# Patient Record
Sex: MALE | Race: WHITE | NOT HISPANIC OR LATINO | Employment: FULL TIME | URBAN - METROPOLITAN AREA
[De-identification: names, ages, dates, MRNs, and addresses within clinical notes are randomized per-mention and may not be internally consistent; named-entity substitution may affect disease eponyms.]

---

## 2017-05-19 ENCOUNTER — HOSPITAL ENCOUNTER (OUTPATIENT)
Dept: RADIOLOGY | Facility: HOSPITAL | Age: 57
Discharge: HOME | End: 2017-05-19
Attending: ORTHOPAEDIC SURGERY
Payer: COMMERCIAL

## 2017-05-19 ENCOUNTER — OFFICE VISIT (OUTPATIENT)
Dept: ORTHOPEDICS | Facility: CLINIC | Age: 57
End: 2017-05-19
Payer: COMMERCIAL

## 2017-05-19 VITALS — HEIGHT: 70 IN | BODY MASS INDEX: 27.35 KG/M2 | WEIGHT: 191 LBS

## 2017-05-19 DIAGNOSIS — M25.511 RIGHT SHOULDER PAIN, UNSPECIFIED CHRONICITY: Primary | ICD-10-CM

## 2017-05-19 DIAGNOSIS — M25.511 ACUTE PAIN OF RIGHT SHOULDER: Primary | ICD-10-CM

## 2017-05-19 DIAGNOSIS — M25.511 RIGHT SHOULDER PAIN, UNSPECIFIED CHRONICITY: ICD-10-CM

## 2017-05-19 PROCEDURE — 96372 THER/PROPH/DIAG INJ SC/IM: CPT | Mod: S$GLB,,, | Performed by: ORTHOPAEDIC SURGERY

## 2017-05-19 PROCEDURE — 99999 PR PBB SHADOW E&M-NEW PATIENT-LVL II: CPT | Mod: PBBFAC,,, | Performed by: ORTHOPAEDIC SURGERY

## 2017-05-19 PROCEDURE — 99203 OFFICE O/P NEW LOW 30 MIN: CPT | Mod: 25,S$GLB,, | Performed by: ORTHOPAEDIC SURGERY

## 2017-05-19 PROCEDURE — 1160F RVW MEDS BY RX/DR IN RCRD: CPT | Mod: S$GLB,,, | Performed by: ORTHOPAEDIC SURGERY

## 2017-05-19 PROCEDURE — 73030 X-RAY EXAM OF SHOULDER: CPT | Mod: TC,PO,RT

## 2017-05-19 PROCEDURE — 73030 X-RAY EXAM OF SHOULDER: CPT | Mod: 26,RT,, | Performed by: RADIOLOGY

## 2017-05-19 RX ORDER — MELOXICAM 15 MG/1
15 TABLET ORAL DAILY
Qty: 30 TABLET | Refills: 1 | Status: SHIPPED | OUTPATIENT
Start: 2017-05-19

## 2017-05-19 RX ORDER — KETOROLAC TROMETHAMINE 30 MG/ML
60 INJECTION, SOLUTION INTRAMUSCULAR; INTRAVENOUS
Status: COMPLETED | OUTPATIENT
Start: 2017-05-19 | End: 2017-05-19

## 2017-05-19 RX ADMIN — KETOROLAC TROMETHAMINE 60 MG: 30 INJECTION, SOLUTION INTRAMUSCULAR; INTRAVENOUS at 10:05

## 2017-05-19 NOTE — PROGRESS NOTES
Barry Stephens, 57 years old, right shoulder pain for two days' time.  No trauma.    Never had a problem in the past.  Pain is up to 10/10 on the pain scale.    Exam today shows that he is unable to move his arm.  It is very painful for him.    The hand is functioning well.  No deficits detected.  He has pain with right   lateral bending of the cervical spine.    X-rays of the shoulder overall look pretty good.    ASSESSMENT:  Shoulder pain, bursitis versus cervical radicular symptoms.    PLAN:  We will get him a shot for Toradol today.  We will get him a prescription   for Mobic and if he is not getting better, we can have him come on back for   further evaluation and possibly consider imaging of his cervical spine or   further imaging of his shoulder.      PBB/PN  dd: 05/19/2017 10:30:16 (CDT)  td: 05/19/2017 18:09:12 (CDT)  Doc ID   #[id number]  Job ID #[id number]    CC:     Further History  Aching pain  Worse with activity  Relieved with rest  No other associated symptoms  No other radiation    Further Exam  Alert and oriented  Pleasant  Contralateral limb has appropriate range of motion for age and condition  Contralateral limb has appropriate strength for age and condition  Contralateral limb has appropriate stability  for age and condition  No adenopathy  Pulses are appropriate for current condition  Skin is intact        Chief Complaint    Chief Complaint   Patient presents with    Shoulder Pain     right       HPI  Barry Taylor is a 57 y.o.  male who presents with       Past Medical History  Past Medical History:   Diagnosis Date    Anxiety     Arthritis     Carbon monoxide poisoning 07/23/2011    with a head concussion    History of migraine headaches     Ulcer        Past Surgical History  No past surgical history on file.    Medications  Current Outpatient Prescriptions   Medication Sig    meclizine (ANTIVERT) 25 mg tablet Take 25 mg by mouth. 1 Tablet Oral Every 8 hours     No current  facility-administered medications for this visit.        Allergies  Review of patient's allergies indicates:  No Known Allergies    Family History  Family History   Problem Relation Age of Onset    Heart disease Mother     COPD Father        Social History  Social History     Social History    Marital status:      Spouse name: N/A    Number of children: N/A    Years of education: N/A     Occupational History    Not on file.     Social History Main Topics    Smoking status: Never Smoker    Smokeless tobacco: Never Used    Alcohol use No    Drug use: No    Sexual activity: Not on file     Other Topics Concern    Not on file     Social History Narrative               Review of Systems     Constitutional: Negative    HENT: Negative  Eyes: Negative  Respiratory: Negative  Cardiovascular: Negative  Musculoskeletal: HPI  Skin: Negative  Neurological: Negative  Hematological: Negative  Endocrine: Negative                 Physical Exam    There were no vitals filed for this visit.  Body mass index is 27.41 kg/(m^2).  Physical Examination:     General appearance -  well appearing, and in no distress  Mental status - awake  Neck - supple  Chest -  symmetric air entry  Heart - normal rate   Abdomen - soft      Assessment     1. Acute pain of right shoulder          Plan

## 2022-08-16 ENCOUNTER — LAB VISIT (OUTPATIENT)
Dept: LAB | Facility: HOSPITAL | Age: 62
End: 2022-08-16
Attending: NURSE PRACTITIONER
Payer: COMMERCIAL

## 2022-08-16 ENCOUNTER — OFFICE VISIT (OUTPATIENT)
Dept: NEUROLOGY | Facility: CLINIC | Age: 62
End: 2022-08-16
Payer: COMMERCIAL

## 2022-08-16 VITALS
WEIGHT: 216.5 LBS | SYSTOLIC BLOOD PRESSURE: 130 MMHG | HEART RATE: 76 BPM | RESPIRATION RATE: 18 BRPM | BODY MASS INDEX: 31.06 KG/M2 | DIASTOLIC BLOOD PRESSURE: 85 MMHG

## 2022-08-16 DIAGNOSIS — G12.20 MOTOR NEURON DISEASE, UNSPECIFIED: ICD-10-CM

## 2022-08-16 DIAGNOSIS — F48.2 PSEUDOBULBAR AFFECT: ICD-10-CM

## 2022-08-16 DIAGNOSIS — R20.2 PARESTHESIAS: ICD-10-CM

## 2022-08-16 DIAGNOSIS — R41.3 OTHER AMNESIA: ICD-10-CM

## 2022-08-16 DIAGNOSIS — R20.2 PARESTHESIAS: Primary | ICD-10-CM

## 2022-08-16 PROCEDURE — 3075F PR MOST RECENT SYSTOLIC BLOOD PRESS GE 130-139MM HG: ICD-10-PCS | Mod: CPTII,S$GLB,, | Performed by: NURSE PRACTITIONER

## 2022-08-16 PROCEDURE — 86140 C-REACTIVE PROTEIN: CPT | Performed by: NURSE PRACTITIONER

## 2022-08-16 PROCEDURE — 84165 PATHOLOGIST INTERPRETATION SPE: ICD-10-PCS | Mod: 26,,, | Performed by: PATHOLOGY

## 2022-08-16 PROCEDURE — 84165 PROTEIN E-PHORESIS SERUM: CPT | Mod: 26,,, | Performed by: PATHOLOGY

## 2022-08-16 PROCEDURE — 83921 ORGANIC ACID SINGLE QUANT: CPT | Performed by: NURSE PRACTITIONER

## 2022-08-16 PROCEDURE — 85652 RBC SED RATE AUTOMATED: CPT | Performed by: NURSE PRACTITIONER

## 2022-08-16 PROCEDURE — 3008F PR BODY MASS INDEX (BMI) DOCUMENTED: ICD-10-PCS | Mod: CPTII,S$GLB,, | Performed by: NURSE PRACTITIONER

## 2022-08-16 PROCEDURE — 99204 OFFICE O/P NEW MOD 45 MIN: CPT | Mod: S$GLB,,, | Performed by: NURSE PRACTITIONER

## 2022-08-16 PROCEDURE — 86334 PATHOLOGIST INTERPRETATION IFE: ICD-10-PCS | Mod: 26,,, | Performed by: PATHOLOGY

## 2022-08-16 PROCEDURE — 99204 PR OFFICE/OUTPT VISIT, NEW, LEVL IV, 45-59 MIN: ICD-10-PCS | Mod: S$GLB,,, | Performed by: NURSE PRACTITIONER

## 2022-08-16 PROCEDURE — 80053 COMPREHEN METABOLIC PANEL: CPT | Performed by: NURSE PRACTITIONER

## 2022-08-16 PROCEDURE — 84425 ASSAY OF VITAMIN B-1: CPT | Performed by: NURSE PRACTITIONER

## 2022-08-16 PROCEDURE — 99999 PR PBB SHADOW E&M-NEW PATIENT-LVL III: ICD-10-PCS | Mod: PBBFAC,,, | Performed by: NURSE PRACTITIONER

## 2022-08-16 PROCEDURE — 3079F PR MOST RECENT DIASTOLIC BLOOD PRESSURE 80-89 MM HG: ICD-10-PCS | Mod: CPTII,S$GLB,, | Performed by: NURSE PRACTITIONER

## 2022-08-16 PROCEDURE — 86334 IMMUNOFIX E-PHORESIS SERUM: CPT | Mod: 26,,, | Performed by: PATHOLOGY

## 2022-08-16 PROCEDURE — 99999 PR PBB SHADOW E&M-NEW PATIENT-LVL III: CPT | Mod: PBBFAC,,, | Performed by: NURSE PRACTITIONER

## 2022-08-16 PROCEDURE — 82607 VITAMIN B-12: CPT | Performed by: NURSE PRACTITIONER

## 2022-08-16 PROCEDURE — 1159F PR MEDICATION LIST DOCUMENTED IN MEDICAL RECORD: ICD-10-PCS | Mod: CPTII,S$GLB,, | Performed by: NURSE PRACTITIONER

## 2022-08-16 PROCEDURE — 83036 HEMOGLOBIN GLYCOSYLATED A1C: CPT | Performed by: NURSE PRACTITIONER

## 2022-08-16 PROCEDURE — 3079F DIAST BP 80-89 MM HG: CPT | Mod: CPTII,S$GLB,, | Performed by: NURSE PRACTITIONER

## 2022-08-16 PROCEDURE — 1159F MED LIST DOCD IN RCRD: CPT | Mod: CPTII,S$GLB,, | Performed by: NURSE PRACTITIONER

## 2022-08-16 PROCEDURE — 86334 IMMUNOFIX E-PHORESIS SERUM: CPT | Performed by: NURSE PRACTITIONER

## 2022-08-16 PROCEDURE — 84207 ASSAY OF VITAMIN B-6: CPT | Performed by: NURSE PRACTITIONER

## 2022-08-16 PROCEDURE — 3075F SYST BP GE 130 - 139MM HG: CPT | Mod: CPTII,S$GLB,, | Performed by: NURSE PRACTITIONER

## 2022-08-16 PROCEDURE — 36415 COLL VENOUS BLD VENIPUNCTURE: CPT | Mod: PO | Performed by: NURSE PRACTITIONER

## 2022-08-16 PROCEDURE — 3008F BODY MASS INDEX DOCD: CPT | Mod: CPTII,S$GLB,, | Performed by: NURSE PRACTITIONER

## 2022-08-16 PROCEDURE — 84443 ASSAY THYROID STIM HORMONE: CPT | Performed by: NURSE PRACTITIONER

## 2022-08-16 PROCEDURE — 86255 FLUORESCENT ANTIBODY SCREEN: CPT | Performed by: NURSE PRACTITIONER

## 2022-08-16 PROCEDURE — 86038 ANTINUCLEAR ANTIBODIES: CPT | Performed by: NURSE PRACTITIONER

## 2022-08-16 PROCEDURE — 84165 PROTEIN E-PHORESIS SERUM: CPT | Performed by: NURSE PRACTITIONER

## 2022-08-16 RX ORDER — GABAPENTIN 300 MG/1
300 CAPSULE ORAL 3 TIMES DAILY
Qty: 90 CAPSULE | Refills: 1 | Status: SHIPPED | OUTPATIENT
Start: 2022-08-16 | End: 2024-04-02

## 2022-08-16 NOTE — ASSESSMENT & PLAN NOTE
Exam and history suggestive of peripheral polyneuropathy  Check serologies for etiologies   Gabapentin for symptom control

## 2022-08-16 NOTE — PATIENT INSTRUCTIONS
We will get a brain MRI to evaluate the abnormal emotional lability    We will get blood work to look for causes of neuropathy    Try gabapentin to help with the painful symptoms    Take 300 mg capsules as follows:  300 mg at bedtime for 1 week  If needed, can increase to 300 mg in the AM / 300 mg in PM  Then, 300 mg in the AM & 600 mg in the PM     You don't have to keep increasing the dose unless the pain is not controlled.     If can cause drowsiness or dizziness, which should go away over time. Let me know if you have any issues with it.     Follow up in about 4-6 weeks after the testing is done

## 2022-08-16 NOTE — PROGRESS NOTES
NEUROLOGY  Outpatient Consultation Visit     [facility]  [address] [facility], [place] [address]  [phone] (office) / [phone] (fax)    Patient Name:  Barry Taylor  :  1960  MR #:  [id number]  Acct #:  [id number]    Date of  Visit: 2022    Other Physicians:  Primary Doctor No (Primary Care Physician)      CHIEF COMPLAINT: Numbness (BLE--more in the left )      HISTORY OF PRESENT ILLNESS:  Barry Taylor is a 62 y.o. R-handed male seen in consultation for numbness and tingling in the BLEs per Self, Aaareferral    Medical history is significant for lumbar DDD and radiculopathy.     For about 3 weeks, has noted N/T in the toes bilaterally, worse on the L. Constant. Painful, like stinging. Bending or stretching the feet helps minimally.     No weakness in feet or legs. No change in gait or falls.     Has known L5-S1 herniated disc. Has had several injections in the past, dating back to 2019. Pain with this was throughout the entire leg, unlike current pain. Saw pain management in [place], thinks last was in 2022.     Starting to have some recurrent pain in the L buttock, but not radiating throughout leg.     No symptoms in the hands.     Also notes emotional lability over the past couple of years. May be watching a comedy on TV and will start crying. Occurs about 2x per week and is quite worrisome to him. Doesn't feel depressed or stressed. Happily , etc.     Had CO poisoning a few years ago, treated with hyperbaric O2 at .     Denies significant cognitive changes, like memory loss. Independent.     Not sleeping well at night due to pain in the feet.     Denies any significant major medical history. Doesn't have PCP, mainly sees ortho and pain mgmt     Non smoker, denies ETOH use.     Allergies:  Review of patient's allergies indicates:  No Known Allergies    Current Medications:  Current Outpatient Medications   Medication Sig Dispense Refill    gabapentin  (NEURONTIN) 300 MG capsule Take 1 capsule (300 mg total) by mouth 3 (three) times daily. 90 capsule 1    meclizine (ANTIVERT) 25 mg tablet Take 25 mg by mouth. 1 Tablet Oral Every 8 hours      meloxicam (MOBIC) 15 MG tablet Take 1 tablet (15 mg total) by mouth once daily. (Patient not taking: Reported on 8/16/2022) 30 tablet 1     No current facility-administered medications for this visit.       Past Medical History:  Past Medical History:   Diagnosis Date    Anxiety     Arthritis     Carbon monoxide poisoning 07/23/2011    with a head concussion    History of migraine headaches     Ulcer        Past Surgical History:  History reviewed. No pertinent surgical history.    Family History:  family history includes COPD in his father; Heart disease in his mother.    Social History:   reports that he has never smoked. He has never used smokeless tobacco. He reports that he does not drink alcohol and does not use drugs.      REVIEW OF SYSTEMS:  As per HPI    PHYSICAL EXAM:  /85 (BP Location: Right arm, Patient Position: Sitting, BP Method: Medium (Automatic))   Pulse 76   Resp 18   Wt 98.2 kg (216 lb 7.9 oz)   BMI 31.06 kg/m²     General: Well groomed. No acute distress.  Pulmonary: Normal effort and rate.   Musculoskeletal: No obvious joint deformities, moves all extremities well.  Extremities: No clubbing, cyanosis or edema.     Neurological exam:  Mental status: Awake and alert.  Oriented to person, place, time and situation. Recent and remote memory appear to be intact. Fund of knowledge normal. Normal attention and concentration.   Speech/Language: Monotone. Fluent and appropriate. No dysarthria or aphasia on conversation. Able to follow complex commands.   Cranial nerves (II-XII): Visual fields full. Pupils equal round and reactive to light, extraocular movements intact, no ptosis, no nystagmus. Facial sensation and symmetry intact bilaterally. Hearing grossly intact. Palate deferred. Shoulder shrug  normal bilaterally. Normal tongue protrusion.   Motor: 5 out of 5 strength throughout the upper and lower extremities bilaterally. Normal bulk and tone. No abnormal movements noted. No drift appreciated.   Sensation: Intact to light touch. Decreased to vibration in the R distal foot, decreased to pinprick in stocking pattern to mid foot bilaterally.  DTR: 2+ at the knees and biceps bilaterally.  Coordination: Finger-nose-finger testing intact bilaterally. SHIVANI normal bilaterally. No tremor.   Gait: Antalgic. Able to heel, toe and tandem.     DIAGNOSTIC DATA:  I have personally reviewed provider notes, labs and imaging made available to me today.     Imaging:  MRI L spine wo 2/2022:  FINDINGS: Alignment is normal.  Bone marrow is normal in signal intensity without focal lesion.  The conus is normal in signal intensity. The conus terminates at the T12-L1 level. Vertebral body heights are maintained.     At the L1-2 level, there is mild disc space narrowing, similar to prior. There is mild posterior broad-based diffuse disc bulging without significant central canal narrowing, also similar to prior. There is no significant neural foraminal narrowing.     At the L2-3 level, there is no evidence of central canal stenosis or neural foraminal narrowing.     At the L3-4 level, there is no evidence of central canal stenosis or neural foraminal narrowing.     At the L4-5 level, there is mild disc space narrowing and desiccation with a mild posterior broad-based diffuse disc bulge. There is posterior facet arthropathy bilaterally. There is at least moderate bilateral neural foraminal narrowing. There is no   significant central canal narrowing.     At the L5-S1 level, there is disc space narrowing and desiccation with a mild posterior broad-based diffuse disc bulge. There is mild bilateral neural foraminal narrowing. There is no significant central canal narrowing.     IMPRESSION:   Mild multilevel degenerative disc disease.  Disease is probably worse at the L4-5 level where there is at least moderate bilateral neural foraminal narrowing. Findings are grossly similar to prior.       Labs:  CBC:   Lab Results   Component Value Date    WBC 5.88 08/22/2012    HGB 15.2 08/22/2012    HCT 43.8 08/22/2012     08/22/2012    MCV 86.7 08/22/2012    RDW 13.3 08/22/2012     BMP:   Lab Results   Component Value Date     08/22/2012    K 3.9 08/22/2012     08/22/2012    CO2 27.1 08/22/2012    BUN 8 08/22/2012    CREATININE 1.1 08/22/2012    GLU 86 08/22/2012    CALCIUM 9.2 08/22/2012     LFTS;   Lab Results   Component Value Date    PROT 7.3 08/22/2012    ALBUMIN 3.5 08/22/2012    BILITOT 0.3 08/22/2012    AST 17 08/22/2012    ALKPHOS 76 08/22/2012    ALT 36 08/22/2012     COAGS:   Lab Results   Component Value Date    INR 1.0 08/22/2012     FLP: No results found for: CHOL, HDL, LDLCALC, TRIG, CHOLHDL        ASSESSMENT & PLAN:  Barry Taylor is a 62 y.o. R-handed male seen in consultation for numbness and tingling in the BLEs and what sounds like PBA.     Problem List Items Addressed This Visit        Psychiatric    Pseudobulbar affect    Current Assessment & Plan     MRI brain               Other    Paresthesias - Primary    Current Assessment & Plan     Exam and history suggestive of peripheral polyneuropathy  Check serologies for etiologies   Gabapentin for symptom control              Other Visit Diagnoses     Other amnesia        Motor neuron disease, unspecified              Follow up: 4-6 weeks     I spent a total of 45 minutes on the day of the visit.    This includes face to face time with the patient, as well as non-face to face time preparing for and completing the visit (review of prior diagnostic testing and clinical notes, obtaining or reviewing history, documenting clinical information in the EMR, independently interpreting and communicating results to the patient/family and coordinating ongoing care).       I  appreciate the opportunity to participate in the care of this patient. Please feel free to contact me with any concerns or questions.       Norma Hinton, [facility]  [facility]  [address] [place] [address]

## 2022-08-17 ENCOUNTER — PATIENT MESSAGE (OUTPATIENT)
Dept: NEUROLOGY | Facility: CLINIC | Age: 62
End: 2022-08-17
Payer: COMMERCIAL

## 2022-08-17 LAB
ALBUMIN SERPL BCP-MCNC: 3.6 G/DL (ref 3.5–5.2)
ALBUMIN SERPL ELPH-MCNC: 3.75 G/DL (ref 3.35–5.55)
ALP SERPL-CCNC: 56 U/L (ref 55–135)
ALPHA1 GLOB SERPL ELPH-MCNC: 0.27 G/DL (ref 0.17–0.41)
ALPHA2 GLOB SERPL ELPH-MCNC: 0.69 G/DL (ref 0.43–0.99)
ALT SERPL W/O P-5'-P-CCNC: 23 U/L (ref 10–44)
ANA SER QL IF: NORMAL
ANION GAP SERPL CALC-SCNC: 6 MMOL/L (ref 8–16)
AST SERPL-CCNC: 18 U/L (ref 10–40)
B-GLOBULIN SERPL ELPH-MCNC: 0.97 G/DL (ref 0.5–1.1)
BILIRUB SERPL-MCNC: 0.4 MG/DL (ref 0.1–1)
BUN SERPL-MCNC: 10 MG/DL (ref 8–23)
CALCIUM SERPL-MCNC: 8.9 MG/DL (ref 8.7–10.5)
CHLORIDE SERPL-SCNC: 107 MMOL/L (ref 95–110)
CO2 SERPL-SCNC: 25 MMOL/L (ref 23–29)
CREAT SERPL-MCNC: 1.1 MG/DL (ref 0.5–1.4)
CRP SERPL-MCNC: 4.4 MG/L (ref 0–8.2)
ERYTHROCYTE [SEDIMENTATION RATE] IN BLOOD BY PHOTOMETRIC METHOD: 8 MM/HR (ref 0–23)
EST. GFR  (NO RACE VARIABLE): >60 ML/MIN/1.73 M^2
ESTIMATED AVG GLUCOSE: 114 MG/DL (ref 68–131)
GAMMA GLOB SERPL ELPH-MCNC: 1.31 G/DL (ref 0.67–1.58)
GLUCOSE SERPL-MCNC: 85 MG/DL (ref 70–110)
HBA1C MFR BLD: 5.6 % (ref 4–5.6)
INTERPRETATION SERPL IFE-IMP: NORMAL
POTASSIUM SERPL-SCNC: 4.3 MMOL/L (ref 3.5–5.1)
PROT SERPL-MCNC: 7 G/DL (ref 6–8.4)
PROT SERPL-MCNC: 7.2 G/DL (ref 6–8.4)
SODIUM SERPL-SCNC: 138 MMOL/L (ref 136–145)
TSH SERPL DL<=0.005 MIU/L-ACNC: 3.87 UIU/ML (ref 0.4–4)
VIT B12 SERPL-MCNC: 459 PG/ML (ref 210–950)

## 2022-08-18 LAB
PATHOLOGIST INTERPRETATION IFE: NORMAL
PATHOLOGIST INTERPRETATION SPE: NORMAL

## 2022-08-22 ENCOUNTER — PATIENT MESSAGE (OUTPATIENT)
Dept: NEUROLOGY | Facility: CLINIC | Age: 62
End: 2022-08-22
Payer: COMMERCIAL

## 2022-08-22 LAB
ANCA AB TITR SER IF: NORMAL TITER
P-ANCA TITR SER IF: NORMAL TITER

## 2022-08-23 ENCOUNTER — PATIENT MESSAGE (OUTPATIENT)
Dept: NEUROLOGY | Facility: CLINIC | Age: 62
End: 2022-08-23
Payer: COMMERCIAL

## 2022-08-23 LAB
METHYLMALONATE SERPL-SCNC: 0.19 UMOL/L
PYRIDOXAL SERPL-MCNC: 5 UG/L (ref 5–50)
VIT B1 BLD-MCNC: 68 UG/L (ref 38–122)

## 2022-08-24 ENCOUNTER — HOSPITAL ENCOUNTER (OUTPATIENT)
Dept: RADIOLOGY | Facility: HOSPITAL | Age: 62
Discharge: HOME | End: 2022-08-24
Attending: NURSE PRACTITIONER
Payer: COMMERCIAL

## 2022-08-24 DIAGNOSIS — R41.3 OTHER AMNESIA: ICD-10-CM

## 2022-08-24 DIAGNOSIS — G12.20 MOTOR NEURON DISEASE, UNSPECIFIED: ICD-10-CM

## 2022-08-24 PROCEDURE — 70551 MRI BRAIN STEM W/O DYE: CPT | Mod: TC,PO

## 2022-08-24 PROCEDURE — 70551 MRI BRAIN WITHOUT CONTRAST: ICD-10-PCS | Mod: 26,,, | Performed by: RADIOLOGY

## 2022-08-24 PROCEDURE — 70551 MRI BRAIN STEM W/O DYE: CPT | Mod: 26,,, | Performed by: RADIOLOGY

## 2022-08-25 ENCOUNTER — PATIENT MESSAGE (OUTPATIENT)
Dept: NEUROLOGY | Facility: CLINIC | Age: 62
End: 2022-08-25
Payer: COMMERCIAL

## 2022-08-26 ENCOUNTER — PATIENT MESSAGE (OUTPATIENT)
Dept: NEUROLOGY | Facility: CLINIC | Age: 62
End: 2022-08-26
Payer: COMMERCIAL